# Patient Record
Sex: MALE | Race: WHITE | URBAN - METROPOLITAN AREA
[De-identification: names, ages, dates, MRNs, and addresses within clinical notes are randomized per-mention and may not be internally consistent; named-entity substitution may affect disease eponyms.]

---

## 2019-01-15 ENCOUNTER — OFFICE VISIT (OUTPATIENT)
Dept: FAMILY MEDICINE | Facility: CLINIC | Age: 54
End: 2019-01-15
Payer: COMMERCIAL

## 2019-01-15 VITALS
TEMPERATURE: 97.7 F | HEIGHT: 69 IN | WEIGHT: 211 LBS | RESPIRATION RATE: 14 BRPM | DIASTOLIC BLOOD PRESSURE: 88 MMHG | SYSTOLIC BLOOD PRESSURE: 130 MMHG | BODY MASS INDEX: 31.25 KG/M2 | OXYGEN SATURATION: 98 % | HEART RATE: 77 BPM

## 2019-01-15 DIAGNOSIS — I86.1 VARICOSE VEIN OF SCROTUM: Primary | ICD-10-CM

## 2019-01-15 DIAGNOSIS — E66.9 OBESITY, UNSPECIFIED CLASSIFICATION, UNSPECIFIED OBESITY TYPE, UNSPECIFIED WHETHER SERIOUS COMORBIDITY PRESENT: ICD-10-CM

## 2019-01-15 DIAGNOSIS — R03.0 ELEVATED BLOOD PRESSURE READING WITHOUT DIAGNOSIS OF HYPERTENSION: ICD-10-CM

## 2019-01-15 PROCEDURE — 99203 OFFICE O/P NEW LOW 30 MIN: CPT | Performed by: FAMILY MEDICINE

## 2019-01-15 ASSESSMENT — MIFFLIN-ST. JEOR: SCORE: 1792.47

## 2019-01-15 NOTE — PROGRESS NOTES
SUBJECTIVE:  Landon Bush is a 54 year old male who presents with mass of left scrotum. Symptom onset has been gradual, waxing and waning for a time period of 3 months. Severity is described as mild. Course of his symptoms over time is waxing and waning.       Patient Active Problem List    Diagnosis Date Noted     Obesity      Priority: Medium     CARDIOVASCULAR SCREENING; LDL GOAL LESS THAN 160 10/31/2010     Priority: Medium       Past Medical History:   Diagnosis Date     Hx of vasectomy      Obesity        Past Surgical History:   Procedure Laterality Date     COLONOSCOPY  6-22-11    Repeat in 10 yrs.       Social History     Socioeconomic History     Marital status:      Spouse name: Not on file     Number of children: Not on file     Years of education: Not on file     Highest education level: Not on file   Social Needs     Financial resource strain: Not on file     Food insecurity - worry: Not on file     Food insecurity - inability: Not on file     Transportation needs - medical: Not on file     Transportation needs - non-medical: Not on file   Occupational History     Employer: [employer]   Tobacco Use     Smoking status: Never Smoker     Smokeless tobacco: Never Used   Substance and Sexual Activity     Alcohol use: Yes     Comment: 1 drink per week      Drug use: No     Sexual activity: Yes     Partners: Female   Other Topics Concern     Parent/sibling w/ CABG, MI or angioplasty before 65F 55M? Not Asked   Social History Narrative     Not on file       Family History   Problem Relation Age of Onset     Melanoma Father      Diabetes No family hx of      Coronary Artery Disease No family hx of        No current outpatient medications on file.     No current facility-administered medications for this visit.        Allergies:  Patient has no known allergies.    ROS:  General: weight gain  RESP: No coughing, wheezing or shortness of breath  CV: No chest pains or palpitations  : No urinary  "frequency or dysuria, bladder or kidney problems  MUSCULOSKELETAL: No significant muscle or joint pains  PSYCHIATRIC: No problems with anxiety, depression or mental health  HEME/IMMUNE/ALLERGY: No history of bleeding or clotting problems or anemia.  No allergies or immune system problems  ENDOCRINE: No history of thyroid disease, diabetes or other endocrine disorders  SKIN: No rashes,worrisome lesions or skin problems     EXAM:  /88   Pulse 77   Temp 97.7  F (36.5  C) (Oral)   Resp 14   Ht 1.753 m (5' 9\")   Wt 95.7 kg (211 lb)   SpO2 98%   BMI 31.16 kg/m    GENERAL APPEARANCE: healthy, alert and no distress  RESP: lungs clear to auscultation - no rales, rhonchi or wheezes  CV: regular rates and rhythm, normal S1 S2, no S3 or S4 and no murmur, click or rub -  GU_male: testicles normal without atrophy or masses, no hernias, penis normal without urethral discharge and varicocele present left  MS: extremities normal- no gross deformities noted, no evidence of inflammation in joints, FROM in all extremities.  PSYCH: mentation appears normal and affect normal/bright    ASSESSMENT/PLAN:  (I86.1) Varicose vein of scrotum  (primary encounter diagnosis)  Plan: US Testicular & Scrotum w Doppler Ltd        The patient is reassured that these symptoms do not appear to represent a serious or threatening condition.     (E66.9) Obesity, unspecified classification, unspecified obesity type, unspecified whether serious comorbidity present  (R03.0) Elevated blood pressure reading without diagnosis of hypertension  Plan: Counseled to make better food choices, exercise as tolerated, and lose weight.         Meron Whitaker MD  Department of Family [address] [place] [address]  [phone] (voicemail)   "

## 2019-01-15 NOTE — PATIENT INSTRUCTIONS
Call the imaging center at [phone] or  [phone] to schedule your testicular ultrasound.    Did you know you can lower your blood pressure with your daily habits?    *Losing 20 pounds of weight lowers blood pressure 5 to 20 points.  *Eating a low-fat diet rich in fruits, vegetables and low-fat dairy lowers blood pressure 8 to 14 points.  *Eating a low-salt diet lowers blood pressure 2 to 8 points.  *Exercising regularly lowers blood pressure 4 to 9 points.  *Reducing alcohol use lowers blood pressure 2 to 4 points.    It is recommended that you get a vaccination for shingles called Shingrix (given as 2 shots, 2 to 6 months apart), even if you have already had the Zostavax vaccine. Discuss getting the Shingix vaccine from your pharmacist, or schedule an ancillary shot visit here. Some insurances do not cover the cost of these vaccines.        Patient Education     Varicocele  A varicocele (KIO-zx-hnh-seel) is a swelling in the veins above the testicles. It is similar to a varicose vein in the legs. The swelling happens when too much blood collects in the veins. It most often occurs around the left testicle. A varicocele may cause the sac of skin covering the testicles (the scrotum) to have a bluish color. It may also cause an achy or heavy feeling in the scrotum. The pain may be worse later in the day or after you have been standing for a long time. Some varicoceles can be seen all the time. Others can be seen only when you are standing. Or they may only be seen when a Valsalva maneuver is done. This means bearing down in your belly (abdomen) to increase pressure.  In most cases, a varicocele is not serious and doesn't need to be treated. But it is linked to being unable to have a child (infertility). If you and your partner are trying to have a baby, talk with your healthcare provider about your options.    No medicines are available to fix this condition. Surgery can be done to close off the enlarged veins.  A varicocele is not serious. And all surgery has risks. So you and your healthcare provider may consider surgery only if:    The pain becomes worse or you have new symptoms    The testicle shrinks (atrophy)    You want to try to improve your fertility  Home care  To help lessen discomfort, aching, or pain:    Wear a jockstrap or snug underwear    Take an over-the-counter pain reliever, such as ibuprofen  Follow-up care  Follow up with your healthcare provider, or as advised. Schedule regular exams with your provider so the varicocele can be watched. Be sure to keep all your appointments. Tell your provider if you notice any changes in your testicles or scrotum.   When to seek medical advice  Call your healthcare provider right away if any of these occur:    Increasing pain in your scrotum    Trouble urinating    A lump in the testicle    A bulge in the groin area appears or gets bigger  Date Last Reviewed: 6/1/2016 2000-2018 [employer]. [address] [place] [address]. All rights reserved. This information is not intended as a substitute for professional medical care. Always follow your healthcare professional's instructions.         [facility]    If you have any questions regarding to your visit please contact your care team:       Team Purple:   Clinic Hours Telephone Number   Dr. Alicia Little   7am-7pm  Monday - Thursday   7am-5pm  Fridays  [phone]  (Appointment scheduling available 24/7)   Urgent [facility] - [place] - 11am-9pm Monday-Friday Saturday-Sunday- 9am-5pm   [place] - 5pm-9pm Monday-Friday Saturday-Sunday- 9am-5pm  [phone] - [place]  [phone] - [place]       What options do I have for a visit other than an office visit? We offer electronic visits (e-visits) and telephone visits, when medically appropriate.  Please check with your medical insurance to see if these types  of visits are covered, as you will be responsible for any charges that are not paid by your insurance.      You can use [employer] (secure electronic communication) to access to your chart, send your primary care provider a message, or make an appointment. Ask a team member how to get started.     For a price quote for your services, please call our Consumer Price Line at [phone] or our Imaging Cost estimation line at [phone] (for imaging tests).

## 2019-01-17 ENCOUNTER — ANCILLARY PROCEDURE (OUTPATIENT)
Dept: ULTRASOUND IMAGING | Facility: CLINIC | Age: 54
End: 2019-01-17
Payer: COMMERCIAL

## 2019-01-17 DIAGNOSIS — I86.1 VARICOSE VEIN OF SCROTUM: ICD-10-CM

## 2019-01-17 PROCEDURE — 76870 US EXAM SCROTUM: CPT | Mod: 51

## 2019-01-17 PROCEDURE — 93976 VASCULAR STUDY: CPT

## 2019-01-18 NOTE — RESULT ENCOUNTER NOTE
Your results are normal with exception of a benign varicocele, as we discussed, and a benign cyst on the right. No follow-up is necessary.     Meron Whitaker MD

## 2019-06-04 ENCOUNTER — ALLIED HEALTH/NURSE VISIT (OUTPATIENT)
Dept: NURSING | Facility: CLINIC | Age: 54
End: 2019-06-04
Payer: COMMERCIAL

## 2019-06-04 DIAGNOSIS — Z11.1 SCREENING EXAMINATION FOR PULMONARY TUBERCULOSIS: Primary | ICD-10-CM

## 2019-06-04 PROCEDURE — 86580 TB INTRADERMAL TEST: CPT

## 2019-06-04 NOTE — PROGRESS NOTES
Prior to injection, verified patient identity using patient's name and date of birth.  Due to injection administration, patient instructed to remain in clinic for 15 minutes  afterwards, and to report any adverse reaction to me immediately.    Iron.iox     Drug Amount Wasted:  None.  Vial/Syringe: Multi dose vial  Expiration Date:  05/22/2021

## 2019-06-06 ENCOUNTER — ALLIED HEALTH/NURSE VISIT (OUTPATIENT)
Dept: NURSING | Facility: CLINIC | Age: 54
End: 2019-06-06
Payer: COMMERCIAL

## 2019-06-06 DIAGNOSIS — Z11.1 SCREENING EXAMINATION FOR PULMONARY TUBERCULOSIS: Primary | ICD-10-CM

## 2019-06-06 LAB
PPDINDURATION: 0 MM (ref 0–5)
PPDREDNESS: 0 MM

## 2019-06-06 PROCEDURE — 99207 ZZC NO CHARGE NURSE ONLY: CPT

## 2019-06-06 NOTE — PROGRESS NOTES
Patient in clinic for mantoux results reading for work    Mantoux result:  Lab Results   Component Value Date    PPDREDNESS 0 06/06/2019    PPDINDURATIO 0 06/06/2019     Is induration greater than 5mm?  No        Deniz Kohler RN

## 2019-06-06 NOTE — PATIENT INSTRUCTIONS
Mantoux result:  Lab Results   Component Value Date    PPDREDNESS 0 06/06/2019    PPDINDURATIO 0 06/06/2019     Is induration greater than 5mm?  No

## 2020-02-23 ENCOUNTER — HEALTH MAINTENANCE LETTER (OUTPATIENT)
Age: 55
End: 2020-02-23

## 2021-04-11 ENCOUNTER — HEALTH MAINTENANCE LETTER (OUTPATIENT)
Age: 56
End: 2021-04-11

## 2021-09-26 ENCOUNTER — HEALTH MAINTENANCE LETTER (OUTPATIENT)
Age: 56
End: 2021-09-26

## 2022-05-07 ENCOUNTER — HEALTH MAINTENANCE LETTER (OUTPATIENT)
Age: 57
End: 2022-05-07

## 2023-04-22 ENCOUNTER — HEALTH MAINTENANCE LETTER (OUTPATIENT)
Age: 58
End: 2023-04-22

## 2023-06-02 ENCOUNTER — HEALTH MAINTENANCE LETTER (OUTPATIENT)
Age: 58
End: 2023-06-02